# Patient Record
Sex: MALE | Race: WHITE
[De-identification: names, ages, dates, MRNs, and addresses within clinical notes are randomized per-mention and may not be internally consistent; named-entity substitution may affect disease eponyms.]

---

## 2020-12-08 ENCOUNTER — HOSPITAL ENCOUNTER (EMERGENCY)
Dept: HOSPITAL 12 - ER | Age: 30
Discharge: HOME | End: 2020-12-08
Payer: SELF-PAY

## 2020-12-08 VITALS — BODY MASS INDEX: 25.01 KG/M2 | HEIGHT: 68 IN | WEIGHT: 165 LBS

## 2020-12-08 VITALS — SYSTOLIC BLOOD PRESSURE: 118 MMHG | DIASTOLIC BLOOD PRESSURE: 55 MMHG

## 2020-12-08 DIAGNOSIS — S61.431A: Primary | ICD-10-CM
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
## 2020-12-08 DIAGNOSIS — S61.411A: ICD-10-CM

## 2020-12-08 PROCEDURE — A4217 STERILE WATER/SALINE, 500 ML: HCPCS

## 2020-12-08 PROCEDURE — A4663 DIALYSIS BLOOD PRESSURE CUFF: HCPCS

## 2020-12-08 NOTE — NUR
Patient c/o of dog bite on right hand and left calf area about 1hr PTA. Patient 
states he was walking his dog and another dog with an own came up to them and 
both dogs started fighting. Patient attempted to break up fight but was bitten 
on thr process.